# Patient Record
Sex: MALE | Race: WHITE | Employment: FULL TIME | URBAN - METROPOLITAN AREA
[De-identification: names, ages, dates, MRNs, and addresses within clinical notes are randomized per-mention and may not be internally consistent; named-entity substitution may affect disease eponyms.]

---

## 2024-02-28 ENCOUNTER — HOSPITAL ENCOUNTER (OUTPATIENT)
Dept: PREADMISSION TESTING | Age: 34
Discharge: HOME | End: 2024-02-28
Payer: COMMERCIAL

## 2024-02-28 VITALS
RESPIRATION RATE: 16 BRPM | BODY MASS INDEX: 27.16 KG/M2 | TEMPERATURE: 97.2 F | HEART RATE: 66 BPM | DIASTOLIC BLOOD PRESSURE: 79 MMHG | HEIGHT: 71 IN | SYSTOLIC BLOOD PRESSURE: 146 MMHG | WEIGHT: 194 LBS | OXYGEN SATURATION: 98 %

## 2024-02-28 LAB
ANION GAP SERPL CALCULATED.3IONS-SCNC: 8 MMOL/L (ref 9–17)
BUN SERPL-MCNC: 13 MG/DL (ref 6–20)
BUN/CREAT SERPL: 14 (ref 9–20)
CALCIUM SERPL-MCNC: 9 MG/DL (ref 8.6–10.4)
CHLORIDE SERPL-SCNC: 104 MMOL/L (ref 98–107)
CO2 SERPL-SCNC: 26 MMOL/L (ref 20–31)
CREAT SERPL-MCNC: 0.9 MG/DL (ref 0.7–1.2)
ERYTHROCYTE [DISTWIDTH] IN BLOOD BY AUTOMATED COUNT: 11.3 % (ref 11.8–14.4)
GFR SERPL CREATININE-BSD FRML MDRD: >60 ML/MIN/1.73M2
GLUCOSE SERPL-MCNC: 95 MG/DL (ref 70–99)
HCT VFR BLD AUTO: 42.9 % (ref 40.7–50.3)
HGB BLD-MCNC: 14.7 G/DL (ref 13–17)
MCH RBC QN AUTO: 30.8 PG (ref 25.2–33.5)
MCHC RBC AUTO-ENTMCNC: 34.3 G/DL (ref 28.4–34.8)
MCV RBC AUTO: 89.7 FL (ref 82.6–102.9)
NRBC BLD-RTO: 0 PER 100 WBC
PLATELET # BLD AUTO: 250 K/UL (ref 138–453)
PMV BLD AUTO: 9.8 FL (ref 8.1–13.5)
POTASSIUM SERPL-SCNC: 4.1 MMOL/L (ref 3.7–5.3)
RBC # BLD AUTO: 4.78 M/UL (ref 4.21–5.77)
SODIUM SERPL-SCNC: 138 MMOL/L (ref 135–144)
WBC OTHER # BLD: 7 K/UL (ref 3.5–11.3)

## 2024-02-28 PROCEDURE — 85027 COMPLETE CBC AUTOMATED: CPT

## 2024-02-28 PROCEDURE — 36415 COLL VENOUS BLD VENIPUNCTURE: CPT

## 2024-02-28 PROCEDURE — 93005 ELECTROCARDIOGRAM TRACING: CPT | Performed by: OTOLARYNGOLOGY

## 2024-02-28 PROCEDURE — 80048 BASIC METABOLIC PNL TOTAL CA: CPT

## 2024-02-28 NOTE — H&P
History and Physical Service   [facility]    HISTORY AND PHYSICAL EXAMINATION            Date of Evaluation: 2/28/2024  Patient name:  Michael Aguayo  MRN:   [id number]  YOB: 1990  PCP:    No primary care provider on file.    History Obtained From:     Patient, medical records    History of Present Illness:     This is Michael Aguayo a 33 y.o. male who presents for a pre-admission testing appointment for an upcoming RIGHT MYRINGOTOMY WITH  TUBE INSERTION by Arash Del Valle MD scheduled on 03/08/2024 at 0900 due to Conductive hearing loss, unilateral, right ear, with unrestricted hearing *. The patient's chief complaint is right ear fullness, discomfort, and muffled hearing that has progressively worsened over the past 2 years.  Patient has long-standing issues with his ears and states he had several sets of ear tubes as a child. Throughout high school and college the issues seemed to be resolved but more recently he has had chronic fluid build up behind his right ear. He has tried multiple different OTC allergy medications as well as daily Flonase with no relief in symptoms. He has had previous ENT surgeries including tonsillectomy and adenoidectomy and a rhinoplasty procedure. He denies other associated ENT symptoms including chronic congestion, cough, sinus pressure, visual changes. He was referred to Dr. Del Valle who recommended surgical intervention. Denies recent falls and injuries.     Functional Capacity per pt:  1) Pt is able to walk 2 city blocks on level ground without SOB.  2) Pt is able to climb 2 flights of stairs without SOB.  3) Pt is able to walk up a hill for 1-2 city blocks without SOB.    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

## 2024-02-28 NOTE — PRE-PROCEDURE INSTRUCTIONS
ARRIVE AT THE HOSPITAL ON Friday, 3/8/24 Arrival time per Dr Del Valle's office    Once you enter the hospital lobby, take the elevators to the second floor.  Check-In is at the surgery registration desk.      Continue to take your home medications as you normally do up to and including the night before surgery with the exception of any blood thinning medications.      Blood Thinning Medications:  Please stop prescription blood thinning medications such as Apixaban (Eliquis); Clopidogrel (Plavix); Dabigatran (Pradaxa); Prasugrel (Effient); Rivaroxaban (Xarelto); Ticagrelor (Brilinta); Warfarin (Coumadin) only as directed by your surgeon and/or the prescribing physician    Some common examples of other medications that can thin your blood are: Aspirin, Ibuprofen (Advil, Motrin), Naproxen (Aleve), Meloxicam (Mobic), Celecoxib (Celebrex), Fish Oil, many Herbal Supplements.  These medications should usually be stopped at least 7 days prior to surgery.    None    Tylenol is OK to take for pain the week prior to surgery.    Failure to stop certain medications may interfere with your scheduled surgery.    If you receive instructions from your surgeon regarding what medications to stop prior to surgery, please follow those specific instructions.      If You Have Diabetes:  Do not take any of your diabetic medications, (injectables or by mouth) the morning of surgery unless otherwise instructed by the doctor who manages your diabetes. If you are taking insulin, contact the doctor the manages your diabetes for instructions about any changes to your insulin dosages the day before surgery.        Please take the following medication(s) the day of surgery with a small sip of water:  None    Please use your inhaler(s) if needed and bring your inhaler(s) from home the day of surgery      PREPARING FOR YOUR SURGERY:     Before surgery, you can play an important role in your own health. Because skin is not sterile, we need to be sure  that your skin is as free of germs as possible before surgery by carefully washing before surgery.  Preparing or “prepping” skin before surgery can reduce the risk of a “surgical site infection.”  Do not shave the area of your body where your surgery will be performed unless you received specific permission from your physician.    Preoperative Bathing Instructions  Bathe or shower the day of surgery.  Wear clean clothing after bathing.  Shampoo hair before surgery  Keep nails clean    Do not apply lotion/creams/oils; cosmetics; perfumes/aftershave; deodorant; alcohol based hair or skin products the day of surgery.  Do not shave the area of your body where your surgery will be performed unless you receive specific permission from your surgeon.       Patient Instructions:    If you are having any type of anesthesia you are to have nothing to eat or drink after midnight the night before your surgery.  This includes gum, hard candy, mints, water or smoking or chewing tobacco.  The only exception to this is a small sip of water to take the medications listed above on the morning of surgery.  No alcoholic beverages for 24 hours prior to surgery.    You may brush your teeth but do not swallow the water.    Bring your eyeglasses and case with you.  No contacts are to be worn the day of surgery.  You also may bring your hearing aids.  Most surgical procedures involving anesthesia will require that you remove your dentures prior to surgery.    If you are on C-PAP or Bi-PAP at home and plan on staying in the hospital overnight for your surgery please bring the machine with you.    Do not wear any jewelry or body piercings day of surgery.  No nail polish on the operative extremity (arm/leg surgeries)    If you are staying overnight with us, please bring a small bag of necessary personal items.      Please wear loose, comfortable clothing.  If you are potentially going to have a cast or brace bring clothing that will fit over

## 2024-02-29 LAB
EKG ATRIAL RATE: 68 BPM
EKG P AXIS: 61 DEGREES
EKG P-R INTERVAL: 190 MS
EKG Q-T INTERVAL: 408 MS
EKG QRS DURATION: 82 MS
EKG QTC CALCULATION (BAZETT): 433 MS
EKG R AXIS: 58 DEGREES
EKG T AXIS: 49 DEGREES
EKG VENTRICULAR RATE: 68 BPM

## 2024-03-07 ENCOUNTER — ANESTHESIA EVENT (OUTPATIENT)
Dept: OPERATING ROOM | Age: 34
End: 2024-03-07
Payer: COMMERCIAL

## 2024-03-07 NOTE — H&P
Interval H&P Note    Pt Name: Michael Aguayo  MRN: [id number]  YOB: 1990  Date of evaluation: 3/8/2024      [x] I have reviewed in Epic the H&P by CECILE Adorno dated 02/28/2024, attached below for an Interval History and Physical note.     [x] I have examined  Michael Aguayo, a 33 y.o. male.There are no changes to the patient who is scheduled for RIGHT MYRINGOTOMY WITH  TUBE INSERTION by Arash Del Valle MD for Conductive hearing loss of right ear with unrestricted hearing of left ear. The patient denies new health changes, fever, chills, wheezing, cough, increased SOB, chest pain, open sores or wounds. Denies hx of diabetes. Denies any current blood thinning medications.     Vital signs: BP (!) 144/89   Pulse 75   Temp 98.6 °F (37 °C)   Resp 18   Ht 1.803 m (5' 11\")   Wt 88 kg (194 lb)   SpO2 100%   BMI 27.06 kg/m²     Allergies:  Patient has no known allergies.    Medications:    Prior to Admission medications    Not on File         This is a 33 y.o. male who is pleasant, cooperative, alert and oriented x3, in no acute distress.    Heart: Heart sounds are normal.  HR 75 regular rate and rhythm without murmur, gallop or rub.   Lungs: Normal respiratory effort with equal expansion, good air exchange, unlabored and clear to auscultation without wheezes or rales bilaterally   Abdomen: soft, nontender, nondistended with bowel sounds active.       Labs:  Recent Labs     02/28/24  0810   HGB 14.7   HCT 42.9   WBC 7.0   MCV 89.7         K 4.1      CO2 26   BUN 13   CREATININE 0.9   GLUCOSE 95       No results for input(s): \"COVID19\" in the last 720 hours.    GINGER Alex CNP  Electronically signed 3/8/2024 at 7:04 AM    History and Physical Service   [facility]    HISTORY AND PHYSICAL EXAMINATION            Date of Evaluation: 2/28/2024  Patient name:  Michael Aguayo  MRN:   [id number]  YOB: 1990  PCP:    No primary care provider on

## 2024-03-08 ENCOUNTER — ANESTHESIA (OUTPATIENT)
Dept: OPERATING ROOM | Age: 34
End: 2024-03-08
Payer: COMMERCIAL

## 2024-03-08 ENCOUNTER — HOSPITAL ENCOUNTER (OUTPATIENT)
Age: 34
Setting detail: OUTPATIENT SURGERY
Discharge: HOME | End: 2024-03-08
Attending: OTOLARYNGOLOGY | Admitting: OTOLARYNGOLOGY
Payer: COMMERCIAL

## 2024-03-08 VITALS
WEIGHT: 194 LBS | HEIGHT: 71 IN | RESPIRATION RATE: 13 BRPM | SYSTOLIC BLOOD PRESSURE: 126 MMHG | BODY MASS INDEX: 27.16 KG/M2 | DIASTOLIC BLOOD PRESSURE: 80 MMHG | TEMPERATURE: 97.2 F | HEART RATE: 67 BPM | OXYGEN SATURATION: 98 %

## 2024-03-08 PROCEDURE — 3700000000 HC ANESTHESIA ATTENDED CARE: Performed by: OTOLARYNGOLOGY

## 2024-03-08 PROCEDURE — 6360000002 HC RX W HCPCS: Performed by: SPECIALIST

## 2024-03-08 PROCEDURE — 2580000003 HC RX 258: Performed by: SPECIALIST

## 2024-03-08 PROCEDURE — L8699 PROSTHETIC IMPLANT NOS: HCPCS | Performed by: OTOLARYNGOLOGY

## 2024-03-08 PROCEDURE — 7100000010 HC PHASE II RECOVERY - FIRST 15 MIN: Performed by: OTOLARYNGOLOGY

## 2024-03-08 PROCEDURE — 2580000003 HC RX 258: Performed by: ANESTHESIOLOGY

## 2024-03-08 PROCEDURE — 2500000003 HC RX 250 WO HCPCS: Performed by: SPECIALIST

## 2024-03-08 PROCEDURE — 7100000011 HC PHASE II RECOVERY - ADDTL 15 MIN: Performed by: OTOLARYNGOLOGY

## 2024-03-08 PROCEDURE — 3600000002 HC SURGERY LEVEL 2 BASE: Performed by: OTOLARYNGOLOGY

## 2024-03-08 PROCEDURE — 2709999900 HC NON-CHARGEABLE SUPPLY: Performed by: OTOLARYNGOLOGY

## 2024-03-08 DEVICE — VENT TUBE 1028145 5PK SHEEHY SILICONE
Type: IMPLANTABLE DEVICE | Site: EAR | Status: FUNCTIONAL
Brand: SHEEHY

## 2024-03-08 RX ORDER — DIPHENHYDRAMINE HYDROCHLORIDE 50 MG/ML
12.5 INJECTION INTRAMUSCULAR; INTRAVENOUS
Status: CANCELLED | OUTPATIENT
Start: 2024-03-08 | End: 2024-03-09

## 2024-03-08 RX ORDER — SODIUM CHLORIDE 0.9 % (FLUSH) 0.9 %
5-40 SYRINGE (ML) INJECTION EVERY 12 HOURS SCHEDULED
Status: DISCONTINUED | OUTPATIENT
Start: 2024-03-08 | End: 2024-03-08 | Stop reason: HOSPADM

## 2024-03-08 RX ORDER — LIDOCAINE HYDROCHLORIDE 10 MG/ML
1 INJECTION, SOLUTION EPIDURAL; INFILTRATION; INTRACAUDAL; PERINEURAL
Status: DISCONTINUED | OUTPATIENT
Start: 2024-03-09 | End: 2024-03-08 | Stop reason: HOSPADM

## 2024-03-08 RX ORDER — PROPOFOL 10 MG/ML
INJECTION, EMULSION INTRAVENOUS PRN
Status: DISCONTINUED | OUTPATIENT
Start: 2024-03-08 | End: 2024-03-08 | Stop reason: SDUPTHER

## 2024-03-08 RX ORDER — FENTANYL CITRATE 50 UG/ML
50 INJECTION, SOLUTION INTRAMUSCULAR; INTRAVENOUS EVERY 5 MIN PRN
Status: CANCELLED | OUTPATIENT
Start: 2024-03-08

## 2024-03-08 RX ORDER — SODIUM CHLORIDE 9 MG/ML
INJECTION, SOLUTION INTRAVENOUS CONTINUOUS
Status: DISCONTINUED | OUTPATIENT
Start: 2024-03-08 | End: 2024-03-08

## 2024-03-08 RX ORDER — OXYCODONE HYDROCHLORIDE 5 MG/1
5 TABLET ORAL
Status: CANCELLED | OUTPATIENT
Start: 2024-03-08 | End: 2024-03-09

## 2024-03-08 RX ORDER — SODIUM CHLORIDE, SODIUM LACTATE, POTASSIUM CHLORIDE, CALCIUM CHLORIDE 600; 310; 30; 20 MG/100ML; MG/100ML; MG/100ML; MG/100ML
INJECTION, SOLUTION INTRAVENOUS CONTINUOUS
Status: DISCONTINUED | OUTPATIENT
Start: 2024-03-08 | End: 2024-03-08 | Stop reason: HOSPADM

## 2024-03-08 RX ORDER — ONDANSETRON 2 MG/ML
4 INJECTION INTRAMUSCULAR; INTRAVENOUS
Status: CANCELLED | OUTPATIENT
Start: 2024-03-08 | End: 2024-03-09

## 2024-03-08 RX ORDER — SODIUM CHLORIDE 0.9 % (FLUSH) 0.9 %
5-40 SYRINGE (ML) INJECTION PRN
Status: DISCONTINUED | OUTPATIENT
Start: 2024-03-08 | End: 2024-03-08 | Stop reason: HOSPADM

## 2024-03-08 RX ORDER — SODIUM CHLORIDE 9 MG/ML
INJECTION, SOLUTION INTRAVENOUS PRN
Status: DISCONTINUED | OUTPATIENT
Start: 2024-03-08 | End: 2024-03-08 | Stop reason: HOSPADM

## 2024-03-08 RX ORDER — CIPROFLOXACIN AND DEXAMETHASONE 3; 1 MG/ML; MG/ML
4 SUSPENSION/ DROPS AURICULAR (OTIC) 2 TIMES DAILY
Qty: 7.5 ML | Refills: 0 | Status: SHIPPED | OUTPATIENT
Start: 2024-03-08 | End: 2024-03-13

## 2024-03-08 RX ORDER — SODIUM CHLORIDE, SODIUM LACTATE, POTASSIUM CHLORIDE, CALCIUM CHLORIDE 600; 310; 30; 20 MG/100ML; MG/100ML; MG/100ML; MG/100ML
INJECTION, SOLUTION INTRAVENOUS CONTINUOUS PRN
Status: DISCONTINUED | OUTPATIENT
Start: 2024-03-08 | End: 2024-03-08 | Stop reason: SDUPTHER

## 2024-03-08 RX ORDER — FENTANYL CITRATE 50 UG/ML
INJECTION, SOLUTION INTRAMUSCULAR; INTRAVENOUS PRN
Status: DISCONTINUED | OUTPATIENT
Start: 2024-03-08 | End: 2024-03-08 | Stop reason: SDUPTHER

## 2024-03-08 RX ORDER — FENTANYL CITRATE 50 UG/ML
25 INJECTION, SOLUTION INTRAMUSCULAR; INTRAVENOUS EVERY 5 MIN PRN
Status: CANCELLED | OUTPATIENT
Start: 2024-03-08

## 2024-03-08 RX ORDER — LIDOCAINE HYDROCHLORIDE 20 MG/ML
INJECTION, SOLUTION EPIDURAL; INFILTRATION; INTRACAUDAL; PERINEURAL PRN
Status: DISCONTINUED | OUTPATIENT
Start: 2024-03-08 | End: 2024-03-08 | Stop reason: SDUPTHER

## 2024-03-08 RX ADMIN — SODIUM CHLORIDE, POTASSIUM CHLORIDE, SODIUM LACTATE AND CALCIUM CHLORIDE: 600; 310; 30; 20 INJECTION, SOLUTION INTRAVENOUS at 06:45

## 2024-03-08 RX ADMIN — PROPOFOL 200 MG: 10 INJECTION, EMULSION INTRAVENOUS at 07:38

## 2024-03-08 RX ADMIN — FENTANYL CITRATE 50 MCG: 50 INJECTION INTRAMUSCULAR; INTRAVENOUS at 07:37

## 2024-03-08 RX ADMIN — LIDOCAINE HYDROCHLORIDE 100 MG: 20 INJECTION, SOLUTION EPIDURAL; INFILTRATION; INTRACAUDAL; PERINEURAL at 07:38

## 2024-03-08 ASSESSMENT — PAIN SCALES - GENERAL: PAINLEVEL_OUTOF10: 0

## 2024-03-08 ASSESSMENT — ENCOUNTER SYMPTOMS: SHORTNESS OF BREATH: 0

## 2024-03-08 ASSESSMENT — PAIN - FUNCTIONAL ASSESSMENT: PAIN_FUNCTIONAL_ASSESSMENT: FACE, LEGS, ACTIVITY, CRY, AND CONSOLABILITY (FLACC)

## 2024-03-08 NOTE — OP NOTE
Operative Note      Patient: Michael Aguayo  YOB: 1990  MRN: [id number]    Date of Procedure: 3/8/2024    Pre-Op Diagnosis Codes:     * Conductive hearing loss of right ear with unrestricted hearing of left ear [H90.11]    Post-Op Diagnosis: Same       Procedure(s):  RIGHT MYRINGOTOMY WITH  TUBE INSERTION    Surgeon(s):  Arash Del Valle MD    Assistant:   * No surgical staff found *    Anesthesia: General    Estimated Blood Loss (mL): Minimal    Complications: None    Specimens:   * No specimens in log *    Implants:  * No implants in log *      Drains: * No LDAs found *    Findings: Right serous otitis media      Detailed Description of Procedure:       Indications:    Michael Aguayo has had recurrent episodes of otitis media.  The patient has had appropriate and aggressive medical management without resolution.  The reason for the procedure, as well as the potential complications, were discussed at great length with the family.  These included, but were not limited to bleeding, infection, persistent perforation after the tube should extrude, need for further surgery, chronic drainage from the ear, and general anesthesia related complications, as well as potential hearing loss.  They appeared to understand and asked me to proceed.      Procedure:    Michael Aguayo was identified in the preoperative and operative suites.  After induction of general mask anesthesia was obtained, and appropriate eye care was performed, the right ear was examined with the use of the operative microscope.  Cerumenectomy was performed with the use of a curette and suction.  The tympanic membrane was seen to be retracted.  An incision was made anterior to the malleus, and the middle ear was suctioned. An ear tube was placed.      The patient was then awakened from general anesthesia and returned to the recovery room in satisfactory condition, where at this point, the family was given both verbal and written instructions with

## 2024-03-08 NOTE — ANESTHESIA POSTPROCEDURE EVALUATION
Department of Anesthesiology  Postprocedure Note    Patient: Michael Aguayo  MRN: [id number]  YOB: 1990  Date of evaluation: 3/8/2024    Procedure Summary       Date: 03/08/24 Room / Location: [address]    Anesthesia Start: 0734 Anesthesia Stop: 0754    Procedure: RIGHT MYRINGOTOMY WITH  TUBE INSERTION (Right: Ear) Diagnosis:       Conductive hearing loss of right ear with unrestricted hearing of left ear      (Conductive hearing loss of right ear with unrestricted hearing of left ear [H90.11])    Surgeons: Arash Del Valle MD Responsible Provider: Javid Alarcon MD    Anesthesia Type: general ASA Status: 3            Anesthesia Type: No value filed.    Anca Phase I: Anca Score: 10    Anca Phase II: Anca Score: 8    Anesthesia Post Evaluation    Cardiovascular status: hemodynamically stable    No notable events documented.

## 2024-03-08 NOTE — ANESTHESIA PRE PROCEDURE
Department of Anesthesiology  Preprocedure Note       Name:  Michael Aguayo   Age:  33 y.o.  :  1990                                          MRN:  [id number]         Date:  3/8/2024      Surgeon: Surgeon(s):  Arash Del Valle MD    Procedure: Procedure(s):  RIGHT MYRINGOTOMY WITH  TUBE INSERTION    Medications prior to admission:   Prior to Admission medications    Not on File       Current medications:    Current Facility-Administered Medications   Medication Dose Route Frequency Provider Last Rate Last Admin   • [START ON 3/9/2024] lidocaine PF 1 % injection 1 mL  1 mL IntraDERmal Once PRN Jorden Farrar MD       • lactated ringers IV soln infusion   IntraVENous Continuous Jorden Farrar  mL/hr at 24 0645 New Bag at 24 0645   • sodium chloride flush 0.9 % injection 5-40 mL  5-40 mL IntraVENous 2 times per day Jorden Farrar MD       • sodium chloride flush 0.9 % injection 5-40 mL  5-40 mL IntraVENous PRN Jorden Farrar MD       • 0.9 % sodium chloride infusion   IntraVENous PRN Jorden Farrar MD         Facility-Administered Medications Ordered in Other Encounters   Medication Dose Route Frequency Provider Last Rate Last Admin   • lactated ringers IV soln infusion   IntraVENous Continuous PRN Jaclyn Santoyo APRN - CRNA   New Bag at 24 0645       Allergies:  No Known Allergies    Problem List:  There is no problem list on file for this patient.      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

## 2024-03-08 NOTE — DISCHARGE INSTRUCTIONS
HOME CARE INSTRUCTION  FOLLOWING MYRINGOTOMY AND TUBE INSERTION      ACTIVITY:  Rest today.     Resume normal activity tomorrow.     No swimming.      DIET:   As tolerated.      GENERAL  INSTRUCTIONS; It is important that you do not get any water into the ear.     Use earplugs to keep water out of the ear when bathing, showering, or washing your child's hair.      MEDICATIONS: Take medications as directed.       If you have questions regarding your usual medications, please contact your primary care physician.      OFFICE VISIT: Call today to confirm your office visit in 4 weeks.    [place] Office:  [phone]    [place] Office:  [phone]      OBSERVE FOR:  Changes in amount or kinds of drainage from ears, especially bloody drainage.     Changes in hearing.     Fever.     Nausea, vomiting, stiff neck.     CALL YOUR PHYSICIAN IF ANY OF THESE OCCUR.    CALL YOUR PHYSICIAN IF YOU HAVE ANY PROBLEM THAT CONCERNS YOU.    Azalea Marion RN  3/8/2024

## (undated) DEVICE — TOWEL,OR,DSP,ST,BLUE,DLX,XR,4/PK,20PK/CS: Brand: MEDLINE

## (undated) DEVICE — SOLUTION IRRIG 500ML 0.9% SOD CHLO USP POUR PLAS BTL

## (undated) DEVICE — BLADE MYR OFFSET 45DEG SPEAR TIP NAR SHFT W/ RND KNURLED

## (undated) DEVICE — GLOVE SURG SZ 7 CRM LTX FREE POLYISOPRENE POLYMER BEAD ANTI

## (undated) DEVICE — APPLICATOR  COTTON-TIPPED 6 IN WOOD STRL

## (undated) DEVICE — 4-PORT MANIFOLD: Brand: NEPTUNE 2

## (undated) DEVICE — MEDICINE CUP, GRADUATED, STER: Brand: MEDLINE

## (undated) DEVICE — SYRINGE, LUER LOCK, 10ML: Brand: MEDLINE